# Patient Record
Sex: FEMALE | Race: WHITE | NOT HISPANIC OR LATINO | Employment: OTHER | URBAN - METROPOLITAN AREA
[De-identification: names, ages, dates, MRNs, and addresses within clinical notes are randomized per-mention and may not be internally consistent; named-entity substitution may affect disease eponyms.]

---

## 2018-01-19 ASSESSMENT — MIFFLIN-ST. JEOR
SCORE: 1399.47
SCORE: 1404

## 2018-01-20 ENCOUNTER — SURGERY - HEALTHEAST (OUTPATIENT)
Dept: GASTROENTEROLOGY | Facility: CLINIC | Age: 68
End: 2018-01-20

## 2018-02-14 ENCOUNTER — SURGERY - HEALTHEAST (OUTPATIENT)
Dept: CARDIOLOGY | Facility: CLINIC | Age: 68
End: 2018-02-14

## 2018-02-14 ASSESSMENT — MIFFLIN-ST. JEOR: SCORE: 1390.85

## 2018-12-10 ENCOUNTER — OFFICE VISIT - HEALTHEAST (OUTPATIENT)
Dept: GERIATRICS | Facility: CLINIC | Age: 68
End: 2018-12-10

## 2018-12-10 DIAGNOSIS — I10 ESSENTIAL HYPERTENSION: ICD-10-CM

## 2018-12-10 DIAGNOSIS — S52.501D CLOSED FRACTURE OF DISTAL END OF RIGHT RADIUS WITH ROUTINE HEALING, UNSPECIFIED FRACTURE MORPHOLOGY, SUBSEQUENT ENCOUNTER: ICD-10-CM

## 2018-12-10 DIAGNOSIS — E11.9 TYPE 2 DIABETES MELLITUS WITHOUT COMPLICATION, WITHOUT LONG-TERM CURRENT USE OF INSULIN (H): ICD-10-CM

## 2018-12-10 DIAGNOSIS — E03.9 ACQUIRED HYPOTHYROIDISM: ICD-10-CM

## 2018-12-11 ENCOUNTER — AMBULATORY - HEALTHEAST (OUTPATIENT)
Dept: ADMINISTRATIVE | Facility: CLINIC | Age: 68
End: 2018-12-11

## 2018-12-11 ENCOUNTER — OFFICE VISIT - HEALTHEAST (OUTPATIENT)
Dept: GERIATRICS | Facility: CLINIC | Age: 68
End: 2018-12-11

## 2018-12-11 DIAGNOSIS — S52.501D CLOSED FRACTURE OF DISTAL END OF RIGHT RADIUS WITH ROUTINE HEALING, UNSPECIFIED FRACTURE MORPHOLOGY, SUBSEQUENT ENCOUNTER: ICD-10-CM

## 2018-12-11 DIAGNOSIS — I10 ESSENTIAL HYPERTENSION: ICD-10-CM

## 2018-12-11 DIAGNOSIS — E11.9 TYPE 2 DIABETES MELLITUS WITHOUT COMPLICATION, WITHOUT LONG-TERM CURRENT USE OF INSULIN (H): ICD-10-CM

## 2018-12-11 RX ORDER — AMOXICILLIN 250 MG
2 CAPSULE ORAL 2 TIMES DAILY
Status: SHIPPED | COMMUNITY
Start: 2018-12-11

## 2018-12-11 RX ORDER — ALBUTEROL SULFATE 90 UG/1
2 AEROSOL, METERED RESPIRATORY (INHALATION) EVERY 4 HOURS PRN
Status: SHIPPED | COMMUNITY
Start: 2018-12-11

## 2018-12-11 RX ORDER — MULTIPLE VITAMINS W/ MINERALS TAB 9MG-400MCG
1 TAB ORAL DAILY
Status: SHIPPED | COMMUNITY
Start: 2018-12-11

## 2018-12-11 RX ORDER — FLUOXETINE 40 MG/1
80 CAPSULE ORAL DAILY
Status: SHIPPED | COMMUNITY
Start: 2018-12-11

## 2018-12-11 RX ORDER — BISACODYL 5 MG/1
5 TABLET, DELAYED RELEASE ORAL DAILY PRN
Status: SHIPPED | COMMUNITY
Start: 2018-12-11

## 2018-12-11 RX ORDER — FLUTICASONE PROPIONATE AND SALMETEROL 113; 14 UG/1; UG/1
1 POWDER, METERED RESPIRATORY (INHALATION) EVERY 12 HOURS PRN
Status: SHIPPED | COMMUNITY
Start: 2018-12-11

## 2018-12-11 RX ORDER — FERROUS SULFATE 325(65) MG
1 TABLET ORAL
Status: SHIPPED | COMMUNITY
Start: 2018-12-11

## 2018-12-11 RX ORDER — LOSARTAN POTASSIUM 100 MG/1
100 TABLET ORAL DAILY
Status: SHIPPED | COMMUNITY
Start: 2018-12-11

## 2018-12-11 RX ORDER — MIRTAZAPINE 15 MG/1
15 TABLET, FILM COATED ORAL AT BEDTIME
Status: SHIPPED | COMMUNITY
Start: 2018-12-11

## 2018-12-12 ENCOUNTER — OFFICE VISIT - HEALTHEAST (OUTPATIENT)
Dept: GERIATRICS | Facility: CLINIC | Age: 68
End: 2018-12-12

## 2018-12-12 DIAGNOSIS — E11.9 TYPE 2 DIABETES MELLITUS WITHOUT COMPLICATION, WITHOUT LONG-TERM CURRENT USE OF INSULIN (H): ICD-10-CM

## 2018-12-12 DIAGNOSIS — S52.501D CLOSED FRACTURE OF DISTAL END OF RIGHT RADIUS WITH ROUTINE HEALING, UNSPECIFIED FRACTURE MORPHOLOGY, SUBSEQUENT ENCOUNTER: ICD-10-CM

## 2018-12-12 DIAGNOSIS — E03.9 ACQUIRED HYPOTHYROIDISM: ICD-10-CM

## 2018-12-12 DIAGNOSIS — I10 ESSENTIAL HYPERTENSION: ICD-10-CM

## 2018-12-13 ENCOUNTER — OFFICE VISIT - HEALTHEAST (OUTPATIENT)
Dept: GERIATRICS | Facility: CLINIC | Age: 68
End: 2018-12-13

## 2018-12-13 DIAGNOSIS — S52.501D CLOSED FRACTURE OF DISTAL END OF RIGHT RADIUS WITH ROUTINE HEALING, UNSPECIFIED FRACTURE MORPHOLOGY, SUBSEQUENT ENCOUNTER: ICD-10-CM

## 2018-12-13 DIAGNOSIS — I10 ESSENTIAL HYPERTENSION: ICD-10-CM

## 2018-12-13 DIAGNOSIS — R42 DIZZINESS: ICD-10-CM

## 2018-12-14 ENCOUNTER — OFFICE VISIT - HEALTHEAST (OUTPATIENT)
Dept: GERIATRICS | Facility: CLINIC | Age: 68
End: 2018-12-14

## 2018-12-14 DIAGNOSIS — E03.9 ACQUIRED HYPOTHYROIDISM: ICD-10-CM

## 2018-12-14 DIAGNOSIS — S52.501D CLOSED FRACTURE OF DISTAL END OF RIGHT RADIUS WITH ROUTINE HEALING, UNSPECIFIED FRACTURE MORPHOLOGY, SUBSEQUENT ENCOUNTER: ICD-10-CM

## 2018-12-14 DIAGNOSIS — I10 ESSENTIAL HYPERTENSION: ICD-10-CM

## 2018-12-17 ENCOUNTER — AMBULATORY - HEALTHEAST (OUTPATIENT)
Dept: GERIATRICS | Facility: CLINIC | Age: 68
End: 2018-12-17

## 2018-12-17 ENCOUNTER — COMMUNICATION - HEALTHEAST (OUTPATIENT)
Dept: GERIATRICS | Facility: CLINIC | Age: 68
End: 2018-12-17

## 2021-05-31 VITALS — HEIGHT: 63 IN | BODY MASS INDEX: 36.32 KG/M2 | WEIGHT: 205 LBS

## 2021-06-01 VITALS — HEIGHT: 63 IN | BODY MASS INDEX: 35.81 KG/M2 | WEIGHT: 202.1 LBS

## 2021-06-02 VITALS — BODY MASS INDEX: 37.73 KG/M2 | WEIGHT: 213 LBS

## 2021-06-16 PROBLEM — I20.89 ANGINA AT REST (H): Status: ACTIVE | Noted: 2018-02-12

## 2021-06-16 PROBLEM — T39.395A NSAID INDUCED GASTRITIS: Status: ACTIVE | Noted: 2018-01-19

## 2021-06-16 PROBLEM — K29.60 NSAID INDUCED GASTRITIS: Status: ACTIVE | Noted: 2018-01-19

## 2021-06-16 PROBLEM — K92.1 BLACK STOOLS: Status: ACTIVE | Noted: 2018-01-19

## 2021-06-16 PROBLEM — S52.501D CLOSED FRACTURE OF DISTAL END OF RIGHT RADIUS WITH ROUTINE HEALING: Status: ACTIVE | Noted: 2018-12-12

## 2021-06-16 PROBLEM — R94.39 ABNORMAL STRESS TEST: Status: ACTIVE | Noted: 2018-02-13

## 2021-06-16 PROBLEM — E03.9 ACQUIRED HYPOTHYROIDISM: Status: ACTIVE | Noted: 2018-12-12

## 2021-06-22 NOTE — PROGRESS NOTES
[facility]    Facility:   [facility] [[id number]]   Code Status: POLST AVAILABLE    Admission evaluation to TCU of 68-year-old female. History is taken from accompanying hospital notes and from patient who gives a detailed history. Long-standing hypertension, hypothyroidism on replacement, chronic depression, asthma, chronic nonspecific dizziness, history of multiple falls, patient was decorating a [employer] tree, stepped backward striking a stool, braced her fall sustaining displaced distal right radius fracture, underwent closed reduction and splinting, stabilized and transferred to TCU for rehabilitation, pain management, management of medical problems. Patient resides with her  in home setting, he recently had a GEORGIE and is unable to care for patient.    Current medical problem list, past medical history, drug allergies, current medication list, social history, family history, full code status reviewed in epic. Family history positive extensively for autoimmune disease, scleroderma in siblings, brother with Crohn's, brother with coronary artery disease.    Review of systems: significant pain right wrist, not using splint, attempting to keep right arm elevated. Patient is left-handed. Describes dizziness as a lightheadedness, at times vertiginous symptoms, questions what could cause this chronic problem. Does not relate dizziness to timing of blood pressure medication or two specific activities. No hearing loss or tinnitus. Unaware of sinus congestion. Chronic sleep apnea with use of CPAP. Questionable sense of congestion ears. No double vision or blurring of vision. Denies chest pain or palpitations. No fever sweats or chills. No active G.I. or  symptoms. Generalized weakness present. No active depressive symptoms, has been caring for multiple family members of late. Remainder of 12 point review of systems obtained negative.    Exam: patient alert, oriented, sling not  present, highly communicative. Sitting in chair, using computer. Blood pressure 113/72, pulse 58, 02 97% on room air, temperature 90.4, respiratory 18. Crowded oral pharynx. No facial asymmetry. PERRLA, EOMI. No credit bruits or HJR. Thyroid finely granular without dominant nodules or tenderness. S1 and S2 regular. Pulmonary exam with shallow inspiratory effort, dry crackles left lung base, no rales or wheezes. Abdomen obese, no organomegaly masses or tenderness. Right arm casted, soft-tissue swelling of all digits, sensation intact, unable to clench right hand fully due to swelling. Left hand intact. Left hand dominant. Periphery without edema. Trace venous stasis changes. Pedal pulses symmetrical. No calf tenderness or swelling.    Impression and plan:   distal radius fracture status post ORIF, significant pain, oxycodone currently in use at 10 mg Q4 hours PRN, patient taking this on a routine basis, reviewed need to elevate arm, sling would be beneficial in this regard. Begin Tylenol 1000 mg TID routine, attempt to limit oxycodone use.   Soft-tissue swelling right hand, no neurologic impairment post surgical, encouraged elevation of hand.    Hypothyroidism on replacement, Krill oil relatively contraindicated with use of Synthroid, discontinue Krilloil.   Low-grade depression on fluoxetine 80 mg Q day and Remeron 15 mg Q day, mood stable   Hypertension on losartan 100 mg Q day hydralazine 10 mg TID times two days, monitor for hypo tension while on hydralazine.   Chronic intermittent dizziness suggestive of inner ear dysfunction, reviewed differential diagnosis of dizziness, potential contribution from medication, management.   Reactive airways/asthma, use of albuterol and airduo to be continued.   Vitamin supplementation reviewed with patient.   Diabetes mellitus 2 on oral agents, Accu checks satisfactory.   Obstructive sleep apnea, continue  CPAP.   Extensive positive family history of autoimmune disease  reviewed. Total time of admission evaluation greater than 45 minutes, greater than 50% of time spent in coordination of care and counseling.      Electronically signed by: Zenaida Mancera MD

## 2021-06-22 NOTE — PROGRESS NOTES
[facility]    Facility:   [facility] [[id number]]   Code Status: POLST AVAILABLE    Reassessment of 68-year-old female admitted to hospital following a fall with distal right radius fracture displaced, underwent ORIF, stabilized in hospital, accelerated hyper tension during hospitalization, hydralazine added to Cozaar on a temporary basis, stabilized in transferred to TCU for rehabilitation, management of multiple medical problems.    Review of systems: attempting to limit use of oxycodone. Continues with out use of sling. Continued swelling right hand. Denies paresthesias right hand, unable to fully grasp hand due two soft-tissue swelling. Chronic complaints of dizziness, continues to have intermittent sense of lightheadedness, no definitive vertiginous symptoms. Denies orthostatic changes. Symptoms present for a prolonged period of time. Denies focal neurologic deficits. No gait impairment. No chest pain or palpitations. Denies orthopnea or PND. Does not feel ready to return to home setting. Remainder of 12 point review of systems obtained negative.    Exam: patient alert and oriented, highly conversant, bends forward attempting to maneuver computer, stands upright without complaints of dizziness or physical signs of distress. Temperature 97.8, pulse 67, respiratory 16, 02 95%, blood pressure 174/85, 164/77. Left tympanic membrane with retraction and dullness, no erythema, loss of cone of light. Right tympanic membrane partially obscured with dry cerumen, loss of cone of light, dullness, no retraction or erythema. Nasal mucosa without bogginess, narrow nasal passages. Extraocular movements intact. Pharynx with crowded anatomy, no postnasal drip, no erythema. No credit bruits or HJR. Thyroid finely granular without dominant nodules. S1 and S2 regular. Pulmonary exam with shallow inspiratory effort, dry crackles left lung base, no wheezes or rales. Abdomen without masses  tenderness organomegaly. Periphery without edema. Pedal pulses symmetrical. No calf tenderness or swelling. Right forearm casted, soft-tissue swelling of all digital right hand, sensation intact.    Impression and plan:   distal right radius fracture, soft-tissue swelling continues postoperatively, patient declining use of sling, encouraged arm elevation at all times.   Hypothyroidism, again reviewed relative contraindication to use of Krill oil with Synthroid.   Chronic intermittent dizziness, retraction of left tympanic membrane, dullness bilateral tympanic membranes supportive of eustachian tube dysfunction dx, begin Flonase nasal spray two sprays Q nostril Q day.   Multiple supplemental vitamin use, reviewed use of vitamin AD and E, reviewed potential side effects of fat-soluble vitamin supplementation.   Hypertension, intermittent elevation of blood pressure, blood pressure trending downward, continues Cozaar, off hydralazine.   Deconditioning with need for rehabilitation. Patient will continue with rehabilitation measures, continues Metformin for diabetes mellitus, multiple concerns are reviewed with prolonged evaluation time.      Electronically signed by: Zenaida Mancera MD

## 2021-06-22 NOTE — PROGRESS NOTES
[facility]    Facility:   [facility] [[id number]]   Code Status: FULL CODE  PCP: Elizabeth Arrieta MD   Phone: [phone]   Fax: [phone]      CHIEF COMPLAINT/REASON FOR VISIT:  Chief Complaint   Patient presents with     Discharge Summary       HISTORY COURSE:  Will discharge on Monday.  Yvonne is a 68 y.o. female who has a primary history of hypertension on losartan and hypothyroidism on Synthroid, who sustained right distal radial fracture and subsequent ORIF on 12/8 after falling backwards and using her right arm to brace herself; she was helping her daughter set up her [employer] tree and tripped over a step stool.  She was transferred to TCU for rehab and pain control.  She is requesting 10 mg oxycodone every 4 hours reports significant pain.  She does have sensation and is able to move fingers slightly in cast.  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     TCU:   Today: Reportedly is removing her cast and Ace wrap at night, she thinks that this is okay because the doctor told her to do it for swelling however the Ace wrap is quite loose and I informed her that she should be having nursing do this so that it can be wrapped appropriately for stabilization.  Swelling appears improved from the other day and her pain is tolerable without oxycodone x 1 day and just acetaminophen 975 three times a day. Her constipation is improved.     R distal radial fx/ORIF: Remains casted and wearing sling, she has full sensation and can wiggle her fingers, nonweightbearing to right upper extremity. Will follow up with hand therapy via [facility].      Hypertension: Had some elevations while hospitalized possibly due to pain, she was discharged on losartan and has had variable blood pressure in TCU. Blood pressures remain elevated 170s/80-90s. D/c losartan and change to hyzaar 100mg/12.5. Follow up  with pcp next week to trend improvement.       Hypothyroid: Synthroid dose was upped in the hospital to 137 mcg.  Most recent TSH was 12 in December 2018. Recheck end Jan/Feb.      DMII: On metformin and diet controlled.       Review of Systems    Vitals:    12/17/18 0958   BP: 173/89   Pulse: 80   Temp: 97  F (36.1  C)   SpO2: 97%       Physical Exam    MEDICATION LIST:  Current Outpatient Medications   Medication Sig     albuterol (VENTOLIN HFA) 90 mcg/actuation inhaler Inhale 2 puffs every 4 (four) hours as needed for wheezing.     bisacodyl (DULCOLAX) 5 mg EC tablet Take 5 mg by mouth daily as needed for constipation.     calcium carbonate-mag hydroxid (ROLAIDS) 550-110 mg Chew Chew 2 tablets 2 (two) times a day as needed.     cholecalciferol, vitamin D3, 1,000 unit tablet Take 1,000 Units by mouth daily Give in morning.           cyanocobalamin 1000 MCG tablet Take 1,000 mcg by mouth daily.     ferrous sulfate 325 (65 FE) MG tablet Take 1 tablet by mouth daily with breakfast.     FLUoxetine (PROZAC) 40 MG capsule Take 80 mg by mouth daily.     fluticasone-salmeterol 113-14 mcg/actuation AePB Inhale 1 puff every 12 (twelve) hours as needed (1 puff).     KRILL OIL ORAL Take 1 capsule by mouth daily.     levothyroxine (SYNTHROID, LEVOTHROID) 137 MCG tablet Take 137 mcg by mouth Daily at 6:00 am.     losartan (COZAAR) 100 MG tablet Take 100 mg by mouth daily Give in morning .     metFORMIN (GLUCOPHAGE) 500 MG tablet Take 500 mg by mouth 2 (two) times a day with meals.     mirtazapine (REMERON) 15 MG tablet Take 15 mg by mouth at bedtime.     multivitamin with minerals (THERA-M) 9 mg iron-400 mcg Tab tablet Take 1 tablet by mouth daily Give in morning .     senna-docusate (SENNOSIDES-DOCUSATE SODIUM) 8.6-50 mg tablet Take 2 tablets by mouth 2 (two) times a day.       DISCHARGE DIAGNOSIS:    ICD-10-CM    1. Closed fracture of distal end of right radius with routine healing, unspecified fracture morphology, subsequent  encounter S52.501D    2. Essential hypertension I10    3. Acquired hypothyroidism E03.9        MEDICAL EQUIPMENT NEEDS:  Has sling; no other equipment needs.     DISCHARGE PLAN/FACE TO FACE:  Follow up with hand therapy via [facility] Monday.   Do not remove ace wrap unless assisted by RN.   Pain control: Acetaminophen 975 three times a day.   Recent switch from losartan to hyzaar on Friday. Please f/u closely with your pcp in 5-7 days.     The patient is, or has been, under my care and I have initiated the establishment of the plan of care. This patient will be followed by a physician who will periodically review the plan of care.    Schedule follow up visit with primary care provider within 7 days to reestablish care.    Electronically signed by: Brandyn Bryant CNP

## 2021-06-22 NOTE — PROGRESS NOTES
[facility]      Facility:    [facility] [[id number]]  Code Status: FULL CODE      Chief Complaint/Reason for Visit:  Chief Complaint   Patient presents with     Review Of Multiple Medical Conditions       HPI:   Yvonne is a 68 y.o. female who has a primary history of hypertension on losartan and hypothyroidism on Synthroid, who sustained right distal radial fracture and subsequent ORIF on 12/8 after falling backwards and using her right arm to brace herself; she was helping her daughter set up her [place] tree and tripped over a step stool.  She was transferred to TCU for rehab and pain control.  She is requesting 10 mg oxycodone every 4 hours reports significant pain.  She does have sensation and is able to move fingers slightly in cast.  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    TCU:   Today she is seen per nursing request pain control she is requesting more oxycodone as she is only sent to the facility with 15 tabs and she is using 2 tabs(10mg) every 4 hours and Tylenol 975 3 times a day.  We discussed long-term pain management and I will renew this dose for 1 week and then discussed titrating down.  She is otherwise relatively independent with ambulation and ADLs, her only other concern today is mild constipation.    R distal radial fx/ORIF: Remains casted and wearing sling, she has full sensation and can wiggle her fingers, nonweightbearing to right upper extremity.    Hypertension: Had some elevations while hospitalized possibly due to pain, she was discharged on losartan and has had variable blood pressure in TCU.    Hypothyroid: Synthroid dose was upped in the hospital to 137 mcg.  Most recent TSH was 12.    DMII: On metformin and diet controlled.      Past Medical History:  Past Medical History:   Diagnosis Date     Anemia      Asthma      Closed right fibular fracture 01/03/2017      Depression      Diabetes mellitus (H)     type 2     Diabetes mellitus, type II (H)      Endometrial polyp      Family history of myocardial infarction     Brother 59 MI      GERD (gastroesophageal reflux disease)      High cholesterol      Hyperlipidemia      Hypertension      Hypothyroidism      Lung nodule     stable-right middle lobe     Monoclonal paraproteinemia      Multiple falls      Nuclear cataract of right eye, nonsenile 2018     Obesity      Ocular hypertension 2016     DARRICK (obstructive sleep apnea)      Pseudophakia of left eye 2018     Shoulder pain 2014     Squamous cell carcinoma of skin            Surgical History:  Past Surgical History:   Procedure Laterality Date     BREAST BIOPSY      biopsy     CARDIAC CATHETERIZATION  2018     CATARACT EXTRACTION Left      CV CORONARY ANGIOGRAM N/A 2018    Procedure: Coronary Angiogram;  Surgeon: Tom Pickens MD;  Location: [facility];  Service:      DILATION AND CURETTAGE OF UTERUS      in her 20's     ESOPHAGOGASTRODUODENOSCOPY N/A 2018    Procedure: ESOPHAGOGASTRODUODENOSCOPY (EGD);  Surgeon: Dejah Paniagua MD;  Location: [facility] GI;  Service:      ORIF DISTAL RADIUS FRACTURE  18     CT LAP GASTRIC BYPASS/JULY-EN-Y N/A 10/1/2014    Procedure: LAPAROSCOPIC JULY-EN-Y GASTRIC BYPASS;  Surgeon: Peterson Turcios MD;  Location: [facility] OR;  Service: General       Family History:   Family History   Problem Relation Age of Onset     Heart attack Brother      Diabetes Brother      Heart disease Brother      Cataracts Mother      Breast cancer Mother      Heart disease Mother      Glaucoma Mother      Macular degeneration Mother      Cataracts Father      Diabetes Sister      Crohn's disease Sister      Thyroid disease Sister         graves       Social History:    Social History     Socioeconomic History     Marital status:      Spouse name: Not on file     Number of children:  Not on file     Years of education: Not on file     Highest education level: Not on file   Social Needs     Financial resource strain: Not on file     Food insecurity - worry: Not on file     Food insecurity - inability: Not on file     Transportation needs - medical: Not on file     Transportation needs - non-medical: Not on file   Occupational History     Not on file   Tobacco Use     Smoking status: Former Smoker     Years: 10.00     Last attempt to quit: 1987     Years since quittin.9     Smokeless tobacco: Never Used   Substance and Sexual Activity     Alcohol use: No     Drug use: No     Sexual activity: Not on file   Other Topics Concern     Not on file   Social History Narrative     Not on file          Review of Systems   Constitutional: Negative for activity change, appetite change, chills, diaphoresis, fatigue and fever.   Respiratory: Negative for cough and shortness of breath.    Cardiovascular: Negative for chest pain.   Gastrointestinal: Positive for constipation. Negative for diarrhea and nausea.   Genitourinary: Negative for dysuria.   Musculoskeletal:        RUE pain, swelling, s/p surgery   Neurological: Negative for numbness.   Psychiatric/Behavioral: Negative for behavioral problems and confusion.       Vitals:    18 0944   BP: 174/85   Pulse: (!) 58   Temp: 98.1  F (36.7  C)   SpO2: 96%   Weight: 213 lb (96.6 kg)       Physical Exam   Constitutional: She appears well-developed and well-nourished.   HENT:   Head: Atraumatic.   Eyes: Scleral icterus is present.   Neck: No thyromegaly present.   Cardiovascular: Normal rate and regular rhythm.   Pulmonary/Chest: No respiratory distress. She has no wheezes.   Abdominal: There is no tenderness. There is no guarding.   Musculoskeletal:   RUE: in sling, fingers swollen, sensation intact, wiggles fingers.    Lymphadenopathy:     She has no cervical adenopathy.   Neurological: No cranial nerve deficit.   Skin: Skin is warm and dry.    Psychiatric: She has a normal mood and affect.       Medication List:  Current Outpatient Medications   Medication Sig     acetaminophen (TYLENOL) 325 MG tablet Take 650 mg by mouth every 4 (four) hours as needed for pain or fever Give 2 tablets by mouth every 4 hours as needed for discomfort/fever for 3 days *Not to exceed 4 GM/24 hours; Contact MD/NP for fever* .     albuterol (VENTOLIN HFA) 90 mcg/actuation inhaler Inhale 2 puffs every 4 (four) hours as needed for wheezing.     bisacodyl (DULCOLAX) 5 mg EC tablet Take 5 mg by mouth daily as needed for constipation.     calcium carbonate-mag hydroxid (ROLAIDS) 550-110 mg Chew Chew 2 tablets 2 (two) times a day as needed.     cholecalciferol, vitamin D3, 1,000 unit tablet Take 1,000 Units by mouth daily Give in morning.           cyanocobalamin 1000 MCG tablet Take 1,000 mcg by mouth daily.     ferrous sulfate 325 (65 FE) MG tablet Take 1 tablet by mouth daily with breakfast.     FLUoxetine (PROZAC) 40 MG capsule Take 80 mg by mouth daily.     fluticasone-salmeterol 113-14 mcg/actuation AePB Inhale 1 puff every 12 (twelve) hours as needed (1 puff).     KRILL OIL ORAL Take 1 capsule by mouth daily.     levothyroxine (SYNTHROID, LEVOTHROID) 137 MCG tablet Take 137 mcg by mouth Daily at 6:00 am.     losartan (COZAAR) 100 MG tablet Take 100 mg by mouth daily Give in morning .     metFORMIN (GLUCOPHAGE) 500 MG tablet Take 500 mg by mouth 2 (two) times a day with meals.     mirtazapine (REMERON) 15 MG tablet Take 15 mg by mouth at bedtime.     multivitamin with minerals (THERA-M) 9 mg iron-400 mcg Tab tablet Take 1 tablet by mouth daily Give in morning .     oxyCODONE (ROXICODONE) 5 MG immediate release tablet Take 10 mg by mouth every 4 (four) hours as needed for pain Give 2 tablets by mouth every 4 hours as needed for pain order clarified by Dr. Garima Fournier, [facility] Physician .     senna-docusate (SENNOSIDES-DOCUSATE SODIUM) 8.6-50 mg tablet Take 2 tablets by mouth 2  (two) times a day.       Labs:  Reviewed; hgb 11.5, wbc 9. TSH 12    Assessment:    ICD-10-CM    1. Essential hypertension I10    2. Closed fracture of distal end of right radius with routine healing, unspecified fracture morphology, subsequent encounter S52.501D    3. Type 2 diabetes mellitus without complication, without long-term current use of insulin (H) E11.9    4. Acquired hypothyroidism E03.9        Plan:  Changes: Give 1 dose milk of mag today and suppository per rectum x1 daily until results, patient has not had BM times 7 days.  She is otherwise comfortable, using oxycodone every 4 hours around-the-clock and Tylenol.  Attending therapies.    Admission history and physical per MD in the next week. At this time continue current care plan for all chronic medical conditions, as they are stable. Encouraged patient to engage in PT/OT for strengthening and conditioning. Encouraged patient to work [facility] nursing staff to ensure any medical complaints are quickly addressed. Follow up this week or sooner if needed. Will continue to monitor patient and work with nursing staff collaboratively to work toward positive patient outcomes.    35 minutes spent with coordination of hospital course and discussion of plan for tcu stay with patient and nursing with 17 minutes spent face to face with patient and nursing.     Electronically signed by: Brandyn Bryant CNP

## 2021-06-22 NOTE — PROGRESS NOTES
[facility]      Facility:    [facility] [[id number]]  Code Status: FULL CODE      Chief Complaint/Reason for Visit:  Chief Complaint   Patient presents with     Review Of Multiple Medical Conditions       HPI:   Yvonne is a 68 y.o. female who has a primary history of hypertension on losartan and hypothyroidism on Synthroid, who sustained right distal radial fracture and subsequent ORIF on 12/8 after falling backwards and using her right arm to brace herself; she was helping her daughter set up her schuyler tree and tripped over a step stool.  She was transferred to TCU for rehab and pain control.  She is requesting 10 mg oxycodone every 4 hours reports significant pain.  She does have sensation and is able to move fingers slightly in cast.  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    TCU:   Today: Reportedly is removing her cast and Ace wrap at night, she thinks that this is okay because the doctor told her to do it for swelling however the Ace wrap is quite loose and I informed her that she should be having nursing do this so that it can be wrapped appropriately for stabilization.  Swelling appears improved from the other day, her pain is tolerable on current regimen.  And her constipation is improved.    R distal radial fx/ORIF: Remains casted and wearing sling, she has full sensation and can wiggle her fingers, nonweightbearing to right upper extremity.    Hypertension: Had some elevations while hospitalized possibly due to pain, she was discharged on losartan and has had variable blood pressure in TCU. Will monitor x 3 days and add additional antihypertensive if no improvement.     Hypothyroid: Synthroid dose was upped in the hospital to 137 mcg.  Most recent TSH was 12.    DMII: On metformin and diet controlled.      Past Medical History:  Past Medical History:   Diagnosis Date     Anemia       Asthma      Closed right fibular fracture 2017     Depression      Diabetes mellitus (H)     type 2     Diabetes mellitus, type II (H)      Endometrial polyp      Family history of myocardial infarction     Brother 59 MI      GERD (gastroesophageal reflux disease)      High cholesterol      Hyperlipidemia      Hypertension      Hypothyroidism      Lung nodule     stable-right middle lobe     Monoclonal paraproteinemia      Multiple falls      Nuclear cataract of right eye, nonsenile 2018     Obesity      Ocular hypertension 2016     DARRICK (obstructive sleep apnea)      Pseudophakia of left eye 2018     Shoulder pain 2014     Squamous cell carcinoma of skin            Surgical History:  Past Surgical History:   Procedure Laterality Date     BREAST BIOPSY      biopsy     CARDIAC CATHETERIZATION  2018     CATARACT EXTRACTION Left      CV CORONARY ANGIOGRAM N/A 2018    Procedure: Coronary Angiogram;  Surgeon: Tom Pickens MD;  Location: [facility];  Service:      DILATION AND CURETTAGE OF UTERUS      in her 20's     ESOPHAGOGASTRODUODENOSCOPY N/A 2018    Procedure: ESOPHAGOGASTRODUODENOSCOPY (EGD);  Surgeon: Dejah Paniagua MD;  Location: [facility] GI;  Service:      ORIF DISTAL RADIUS FRACTURE  18     HI LAP GASTRIC BYPASS/JULY-EN-Y N/A 10/1/2014    Procedure: LAPAROSCOPIC JULY-EN-Y GASTRIC BYPASS;  Surgeon: Peterson Turcios MD;  Location: [facility];  Service: General       Family History:   Family History   Problem Relation Age of Onset     Heart attack Brother      Diabetes Brother      Heart disease Brother      Cataracts Mother      Breast cancer Mother      Heart disease Mother      Glaucoma Mother      Macular degeneration Mother      Cataracts Father      Diabetes Sister      Crohn's disease Sister      Thyroid disease Sister         graves       Social History:    Social History     Socioeconomic History     Marital status:       Spouse name: Not on file     Number of children: Not on file     Years of education: Not on file     Highest education level: Not on file   Social Needs     Financial resource strain: Not on file     Food insecurity - worry: Not on file     Food insecurity - inability: Not on file     Transportation needs - medical: Not on file     Transportation needs - non-medical: Not on file   Occupational History     Not on file   Tobacco Use     Smoking status: Former Smoker     Years: 10.00     Last attempt to quit: 1987     Years since quittin.9     Smokeless tobacco: Never Used   Substance and Sexual Activity     Alcohol use: No     Drug use: No     Sexual activity: Not on file   Other Topics Concern     Not on file   Social History Narrative     Not on file          Review of Systems   Constitutional: Negative for activity change, appetite change, chills, diaphoresis, fatigue and fever.   Respiratory: Negative for cough and shortness of breath.    Cardiovascular: Negative for chest pain.   Gastrointestinal: Positive for constipation. Negative for diarrhea and nausea.   Genitourinary: Negative for dysuria.   Musculoskeletal:        RUE pain, swelling, s/p surgery   Neurological: Negative for numbness.   Psychiatric/Behavioral: Negative for behavioral problems and confusion.       Vitals:    18 1453   BP: 169/80   Pulse: 63   SpO2: 94%       Physical Exam   Constitutional: She appears well-developed and well-nourished.   HENT:   Head: Atraumatic.   Eyes: No scleral icterus.   Neck: No thyromegaly present.   Cardiovascular: Normal rate and regular rhythm.   Pulmonary/Chest: No respiratory distress. She has no wheezes.   Abdominal: There is no tenderness. There is no guarding.   Musculoskeletal:   RUE: in sling, fingers swollen, sensation intact, wiggles fingers.    Lymphadenopathy:     She has no cervical adenopathy.   Neurological: No cranial nerve deficit.   Skin: Skin is warm and dry.    Psychiatric: She has a normal mood and affect.       Medication List:  Current Outpatient Medications   Medication Sig     acetaminophen (TYLENOL) 325 MG tablet Take 650 mg by mouth every 4 (four) hours as needed for pain or fever Give 2 tablets by mouth every 4 hours as needed for discomfort/fever for 3 days *Not to exceed 4 GM/24 hours; Contact MD/NP for fever* .     albuterol (VENTOLIN HFA) 90 mcg/actuation inhaler Inhale 2 puffs every 4 (four) hours as needed for wheezing.     bisacodyl (DULCOLAX) 5 mg EC tablet Take 5 mg by mouth daily as needed for constipation.     calcium carbonate-mag hydroxid (ROLAIDS) 550-110 mg Chew Chew 2 tablets 2 (two) times a day as needed.     cholecalciferol, vitamin D3, 1,000 unit tablet Take 1,000 Units by mouth daily Give in morning.           cyanocobalamin 1000 MCG tablet Take 1,000 mcg by mouth daily.     ferrous sulfate 325 (65 FE) MG tablet Take 1 tablet by mouth daily with breakfast.     FLUoxetine (PROZAC) 40 MG capsule Take 80 mg by mouth daily.     fluticasone-salmeterol 113-14 mcg/actuation AePB Inhale 1 puff every 12 (twelve) hours as needed (1 puff).     KRILL OIL ORAL Take 1 capsule by mouth daily.     levothyroxine (SYNTHROID, LEVOTHROID) 137 MCG tablet Take 137 mcg by mouth Daily at 6:00 am.     losartan (COZAAR) 100 MG tablet Take 100 mg by mouth daily Give in morning .     metFORMIN (GLUCOPHAGE) 500 MG tablet Take 500 mg by mouth 2 (two) times a day with meals.     mirtazapine (REMERON) 15 MG tablet Take 15 mg by mouth at bedtime.     multivitamin with minerals (THERA-M) 9 mg iron-400 mcg Tab tablet Take 1 tablet by mouth daily Give in morning .     oxyCODONE (ROXICODONE) 5 MG immediate release tablet Take 10 mg by mouth every 4 (four) hours as needed for pain Give 2 tablets by mouth every 4 hours as needed for pain order clarified by Dr. Garima Fournier, [facility] Physician .     senna-docusate (SENNOSIDES-DOCUSATE SODIUM) 8.6-50 mg tablet Take 2 tablets by mouth 2  (two) times a day.       Labs:  Reviewed; hgb 11.5, wbc 9. TSH 12    Assessment:    ICD-10-CM    1. Closed fracture of distal end of right radius with routine healing, unspecified fracture morphology, subsequent encounter S52.501D    2. Essential hypertension I10    3. Acquired hypothyroidism E03.9    4. Type 2 diabetes mellitus without complication, without long-term current use of insulin (H) E11.9        Plan:  Changes: Reports she had a BM.  She is otherwise comfortable, using oxycodone every 4 hours around-the-clock and Tylenol.  Attending therapies.  HTN: We will continue to monitor blood pressures if they remain elevated in the 160s where they have been will consider adding additional antihypertensive medication as her pain is well controlled and is likely noncontributory.    25 minutes spent with coordination of hospital course and discussion of plan for tcu stay with patient and nursing with 17 minutes spent face to face with patient and nursing.     Electronically signed by: Brandyn Bryant CNP

## 2022-09-26 ENCOUNTER — APPOINTMENT (OUTPATIENT)
Dept: MRI IMAGING | Facility: CLINIC | Age: 72
End: 2022-09-26
Attending: EMERGENCY MEDICINE
Payer: MEDICARE

## 2022-09-26 ENCOUNTER — HOSPITAL ENCOUNTER (EMERGENCY)
Facility: CLINIC | Age: 72
Discharge: HOME | End: 2022-09-26
Attending: STUDENT IN AN ORGANIZED HEALTH CARE EDUCATION/TRAINING PROGRAM | Admitting: STUDENT IN AN ORGANIZED HEALTH CARE EDUCATION/TRAINING PROGRAM
Payer: MEDICARE

## 2022-09-26 ENCOUNTER — TELEPHONE (OUTPATIENT)
Dept: NEUROLOGY | Facility: CLINIC | Age: 72
End: 2022-09-26

## 2022-09-26 VITALS
OXYGEN SATURATION: 98 % | DIASTOLIC BLOOD PRESSURE: 80 MMHG | RESPIRATION RATE: 16 BRPM | BODY MASS INDEX: 39.69 KG/M2 | HEART RATE: 66 BPM | TEMPERATURE: 97.7 F | WEIGHT: 224 LBS | SYSTOLIC BLOOD PRESSURE: 167 MMHG | HEIGHT: 63 IN

## 2022-09-26 DIAGNOSIS — N39.0 URINARY TRACT INFECTION WITHOUT HEMATURIA, SITE UNSPECIFIED: ICD-10-CM

## 2022-09-26 DIAGNOSIS — R41.0 CONFUSION: ICD-10-CM

## 2022-09-26 DIAGNOSIS — R41.3 MEMORY IMPAIRMENT: ICD-10-CM

## 2022-09-26 LAB
ALBUMIN SERPL-MCNC: 3.3 G/DL (ref 3.5–5)
ALBUMIN UR-MCNC: 50 MG/DL
ALP SERPL-CCNC: 139 U/L (ref 45–120)
ALT SERPL W P-5'-P-CCNC: 21 U/L (ref 0–45)
ANION GAP SERPL CALCULATED.3IONS-SCNC: 9 MMOL/L (ref 5–18)
APPEARANCE UR: ABNORMAL
AST SERPL W P-5'-P-CCNC: 19 U/L (ref 0–40)
ATRIAL RATE - MUSE: 66 BPM
BACTERIA #/AREA URNS HPF: ABNORMAL /HPF
BASOPHILS # BLD AUTO: 0.1 10E3/UL (ref 0–0.2)
BASOPHILS NFR BLD AUTO: 1 %
BILIRUB SERPL-MCNC: 0.3 MG/DL (ref 0–1)
BILIRUB UR QL STRIP: NEGATIVE
BUN SERPL-MCNC: 16 MG/DL (ref 8–28)
CALCIUM SERPL-MCNC: 9.2 MG/DL (ref 8.5–10.5)
CHLORIDE BLD-SCNC: 101 MMOL/L (ref 98–107)
CO2 SERPL-SCNC: 27 MMOL/L (ref 22–31)
COLOR UR AUTO: YELLOW
CREAT SERPL-MCNC: 1.1 MG/DL (ref 0.6–1.1)
DIASTOLIC BLOOD PRESSURE - MUSE: NORMAL MMHG
EOSINOPHIL # BLD AUTO: 0.4 10E3/UL (ref 0–0.7)
EOSINOPHIL NFR BLD AUTO: 4 %
ERYTHROCYTE [DISTWIDTH] IN BLOOD BY AUTOMATED COUNT: 15.1 % (ref 10–15)
GFR SERPL CREATININE-BSD FRML MDRD: 52 ML/MIN/1.73M2
GLUCOSE BLD-MCNC: 176 MG/DL (ref 70–125)
GLUCOSE UR STRIP-MCNC: NEGATIVE MG/DL
HCT VFR BLD AUTO: 44 % (ref 35–47)
HGB BLD-MCNC: 13.8 G/DL (ref 11.7–15.7)
HGB UR QL STRIP: ABNORMAL
HYALINE CASTS: 5 /LPF
IMM GRANULOCYTES # BLD: 0.1 10E3/UL
IMM GRANULOCYTES NFR BLD: 1 %
INR PPP: 0.9 (ref 0.85–1.15)
INTERPRETATION ECG - MUSE: NORMAL
KETONES UR STRIP-MCNC: NEGATIVE MG/DL
LEUKOCYTE ESTERASE UR QL STRIP: ABNORMAL
LYMPHOCYTES # BLD AUTO: 2.8 10E3/UL (ref 0.8–5.3)
LYMPHOCYTES NFR BLD AUTO: 27 %
MAGNESIUM SERPL-MCNC: 2.2 MG/DL (ref 1.8–2.6)
MCH RBC QN AUTO: 28 PG (ref 26.5–33)
MCHC RBC AUTO-ENTMCNC: 31.4 G/DL (ref 31.5–36.5)
MCV RBC AUTO: 89 FL (ref 78–100)
MONOCYTES # BLD AUTO: 0.7 10E3/UL (ref 0–1.3)
MONOCYTES NFR BLD AUTO: 7 %
NEUTROPHILS # BLD AUTO: 6.4 10E3/UL (ref 1.6–8.3)
NEUTROPHILS NFR BLD AUTO: 60 %
NITRATE UR QL: NEGATIVE
NRBC # BLD AUTO: 0 10E3/UL
NRBC BLD AUTO-RTO: 0 /100
P AXIS - MUSE: -27 DEGREES
PH UR STRIP: 6 [PH] (ref 5–7)
PLATELET # BLD AUTO: 450 10E3/UL (ref 150–450)
POTASSIUM BLD-SCNC: 3.6 MMOL/L (ref 3.5–5)
PR INTERVAL - MUSE: 132 MS
PROT SERPL-MCNC: 7.7 G/DL (ref 6–8)
QRS DURATION - MUSE: 88 MS
QT - MUSE: 456 MS
QTC - MUSE: 478 MS
R AXIS - MUSE: -2 DEGREES
RBC # BLD AUTO: 4.93 10E6/UL (ref 3.8–5.2)
RBC URINE: 12 /HPF
SODIUM SERPL-SCNC: 137 MMOL/L (ref 136–145)
SP GR UR STRIP: 1.03 (ref 1–1.03)
SQUAMOUS EPITHELIAL: 1 /HPF
SYSTOLIC BLOOD PRESSURE - MUSE: NORMAL MMHG
T AXIS - MUSE: 79 DEGREES
UROBILINOGEN UR STRIP-MCNC: <2 MG/DL
VENTRICULAR RATE- MUSE: 66 BPM
WBC # BLD AUTO: 10.3 10E3/UL (ref 4–11)
WBC URINE: >182 /HPF

## 2022-09-26 PROCEDURE — 36415 COLL VENOUS BLD VENIPUNCTURE: CPT | Performed by: EMERGENCY MEDICINE

## 2022-09-26 PROCEDURE — 99285 EMERGENCY DEPT VISIT HI MDM: CPT | Mod: 25

## 2022-09-26 PROCEDURE — 83735 ASSAY OF MAGNESIUM: CPT | Performed by: EMERGENCY MEDICINE

## 2022-09-26 PROCEDURE — 81001 URINALYSIS AUTO W/SCOPE: CPT | Performed by: STUDENT IN AN ORGANIZED HEALTH CARE EDUCATION/TRAINING PROGRAM

## 2022-09-26 PROCEDURE — 85610 PROTHROMBIN TIME: CPT | Performed by: EMERGENCY MEDICINE

## 2022-09-26 PROCEDURE — 70544 MR ANGIOGRAPHY HEAD W/O DYE: CPT

## 2022-09-26 PROCEDURE — 250N000013 HC RX MED GY IP 250 OP 250 PS 637: Performed by: STUDENT IN AN ORGANIZED HEALTH CARE EDUCATION/TRAINING PROGRAM

## 2022-09-26 PROCEDURE — 80053 COMPREHEN METABOLIC PANEL: CPT | Performed by: EMERGENCY MEDICINE

## 2022-09-26 PROCEDURE — A9585 GADOBUTROL INJECTION: HCPCS | Performed by: STUDENT IN AN ORGANIZED HEALTH CARE EDUCATION/TRAINING PROGRAM

## 2022-09-26 PROCEDURE — 85025 COMPLETE CBC W/AUTO DIFF WBC: CPT | Performed by: EMERGENCY MEDICINE

## 2022-09-26 PROCEDURE — 70549 MR ANGIOGRAPH NECK W/O&W/DYE: CPT

## 2022-09-26 PROCEDURE — 87086 URINE CULTURE/COLONY COUNT: CPT | Performed by: STUDENT IN AN ORGANIZED HEALTH CARE EDUCATION/TRAINING PROGRAM

## 2022-09-26 PROCEDURE — 255N000002 HC RX 255 OP 636: Performed by: STUDENT IN AN ORGANIZED HEALTH CARE EDUCATION/TRAINING PROGRAM

## 2022-09-26 PROCEDURE — 93005 ELECTROCARDIOGRAM TRACING: CPT | Performed by: EMERGENCY MEDICINE

## 2022-09-26 PROCEDURE — 70553 MRI BRAIN STEM W/O & W/DYE: CPT

## 2022-09-26 RX ORDER — CEPHALEXIN 500 MG/1
500 CAPSULE ORAL ONCE
Status: DISCONTINUED | OUTPATIENT
Start: 2022-09-26 | End: 2022-09-26

## 2022-09-26 RX ORDER — CEFDINIR 300 MG/1
300 CAPSULE ORAL ONCE
Status: COMPLETED | OUTPATIENT
Start: 2022-09-26 | End: 2022-09-26

## 2022-09-26 RX ORDER — GADOBUTROL 604.72 MG/ML
10 INJECTION INTRAVENOUS ONCE
Status: COMPLETED | OUTPATIENT
Start: 2022-09-26 | End: 2022-09-26

## 2022-09-26 RX ORDER — CEFDINIR 300 MG/1
300 CAPSULE ORAL 2 TIMES DAILY
Qty: 14 CAPSULE | Refills: 0 | Status: SHIPPED | OUTPATIENT
Start: 2022-09-26 | End: 2022-10-03

## 2022-09-26 RX ORDER — CLONIDINE HYDROCHLORIDE 0.1 MG/1
0.1 TABLET ORAL ONCE
Status: COMPLETED | OUTPATIENT
Start: 2022-09-26 | End: 2022-09-26

## 2022-09-26 RX ORDER — LORAZEPAM 2 MG/ML
1 INJECTION INTRAMUSCULAR ONCE
Status: DISCONTINUED | OUTPATIENT
Start: 2022-09-26 | End: 2022-09-26 | Stop reason: HOSPADM

## 2022-09-26 RX ADMIN — CLONIDINE HYDROCHLORIDE 0.1 MG: 0.1 TABLET ORAL at 19:31

## 2022-09-26 RX ADMIN — GADOBUTROL 10 ML: 604.72 INJECTION INTRAVENOUS at 17:59

## 2022-09-26 RX ADMIN — CEFDINIR 300 MG: 300 CAPSULE ORAL at 20:51

## 2022-09-26 ASSESSMENT — ACTIVITIES OF DAILY LIVING (ADL)
ADLS_ACUITY_SCORE: 35
ADLS_ACUITY_SCORE: 35

## 2022-09-26 NOTE — ED NOTES
Called MRI, they stated patient is currently calm and does not need the medication. Will call back if symptoms change

## 2022-09-26 NOTE — ED NOTES
Let that assessed the patient for possible stroke code activation given presenting symptoms.  Patient reporting onset of confusion and memory issues since Thursday with escalation to those symptoms today.  I did a limited history and neurological examination out in triage could not appreciate focal neurological deficits.  I did not feel the patient was a stroke code activation based on current examination and 5 days of symptoms.  We are initiating work-up with imaging and laboratory testing.  Full evaluation to follow by ED clinician.     Adithya Mitchell MD  09/26/22 [id number]

## 2022-09-26 NOTE — ED PROVIDER NOTES
EMERGENCY DEPARTMENT ENCOUNTER       ED Course & Medical Decision Making     7:01 PM Spoke with Dr Cain (Neurology)    Final Impression  72 year old female presents for evaluation of confusion that has been ongoing since Thursday (9/22).  Patient reports she was playing cards with her  and all of a sudden could not recall how to play the card game despite having played it for 23 years.  Has had ongoing issues like this that mostly relate to memory including long-term recall.  No other focal neurologic deficits like slurring of speech, focal motor weakness, or facial droop.  Symptoms ongoing for several days at time of arrival thus was not made a stroke code at triage.  MRI/MRA performed, shows some small vessel ischemic disease that is likely age-related, though is otherwise fairly unremarkable.  CBC and BMP also fairly unremarkable.  No recent medication changes, falls, trauma, or other injuries.  Patient was fairly hypertensive at triage with BP of 208/103.  Has a history of hypertension, typically runs in the 150s-160s, though has been seen in the clinic in the 180s in the last few months it looks like.  Discussed with neurology, they recommended attempting to decrease patient's blood pressure about 20% or so, though also recommended urine testing.  If work-up unrevealing and home situation unsafe, could potentially be admitted for EEG monitoring and neurology evaluation.  However, urinalysis returned fairly convincing for urinary tract infection which could potentially be related to her new onset confusion and memory issues thus will attempt trial of treatment.  Patient declining IV antibiotics, though was agreeable to dose of oral antibiotics.  Given dose of Omnicef in the ED and will discharge home with a weeklong course of Omnicef.  Does have primary care follow-up on Friday 9/30 which does seem like a reasonable follow-up..  Did discuss openly with patient, as well as her sister at bedside, that  "the of confusion symptoms may or may not be related to the presumed UTI and symptoms may or may not improve.  If symptoms improving the next few days, was likely related to the UTI and may not need further follow-up.  However, if symptoms fail to improve at all in the next 3 to 5 days will likely need follow-up for further evaluation, possibly with neurology.  Patient given dose of 0.1 mg oral clonidine and pressure improved to the 160s, though did not appear to have much improvement in her memory.  Did discuss home safety with patient as well as her sister that patient could tentatively be discharged home a week trial of this antibiotic course to see if it improves her memory issues, though she would need to refrain from driving and doing other things it would be unsafe to do alone if she were to have a memory lapse.  Patient and sister agreeable with this plan, will discharge home.  Patient lives with her .    Prior to making a final disposition on this patient the results of patient's tests and other diagnostic studies were discussed with the patient. All questions were answered. Patient expressed understanding of the plan and was amenable to it.    Medications   LORazepam (ATIVAN) injection 1 mg (1 mg Intravenous Not Given 9/26/22 1832)   gadobutrol (GADAVIST) injection 10 mL (10 mLs Intravenous Given 9/26/22 1759)   cloNIDine (CATAPRES) tablet 0.1 mg (0.1 mg Oral Given 9/26/22 1931)   cefdinir (OMNICEF) capsule 300 mg (300 mg Oral Given 9/26/22 2051)       Final Impression     1. Urinary tract infection without hematuria, site unspecified    2. Confusion    3. Memory impairment        Chief Complaint     Chief Complaint   Patient presents with     Altered Mental Status     Arrives to ED with c/o AMS that began on Thursday.  reports pt unable to remember how to play card game that pt has played for 23 years. Today pt went to [employer] \"and I knew I had a purpose, but I couldn't remember what it was\". " Denies vision changes. Strength equal to BUE/BLE. No facial droop noted. Speech clear. Provider called to triage for stroke eval, not called.     NOE Cam is a 72 year old female who presents for evaluation of confusion that has been ongoing since Thursday (9/22).     Past Medical History     Past Medical History:   Diagnosis Date     Anemia      Asthma      Closed right fibular fracture 01/03/2017     Depression      Diabetes mellitus (H)      Diabetes mellitus, type II (H)      Endometrial polyp      Family history of myocardial infarction      GERD (gastroesophageal reflux disease)      High cholesterol      Hyperlipidemia      Hypertension      Hypothyroidism      Lung nodule      Monoclonal paraproteinemia      Multiple falls      Nuclear cataract of right eye, nonsenile 04/04/2018     Obesity      Ocular hypertension 02/04/2016     DARRICK (obstructive sleep apnea)      Pseudophakia of left eye 06/29/2018     Shoulder pain 12/22/2014     Squamous cell carcinoma of skin      Past Surgical History:   Procedure Laterality Date     BIOPSY BREAST      biopsy     CARDIAC CATHETERIZATION  02/14/2018     CATARACT EXTRACTION Left      CV CORONARY ANGIOGRAM N/A 2/14/2018    Procedure: Coronary Angiogram;  Surgeon: Tom Pickens MD;  Location: [facility];  Service:      DILATION AND CURETTAGE      in her 20's     ESOPHAGOSCOPY, GASTROSCOPY, DUODENOSCOPY (EGD), COMBINED N/A 1/20/2018    Procedure: ESOPHAGOGASTRODUODENOSCOPY (EGD);  Surgeon: Dejah Painagua MD;  Location: [facility];  Service:      ORIF DISTAL RADIUS FRACTURE  12/18/18     CT LAP GASTRIC BYPASS/JULY-EN-Y N/A 10/1/2014    Procedure: LAPAROSCOPIC JULY-EN-Y GASTRIC BYPASS;  Surgeon: Peterson Turcios MD;  Location: [facility] OR;  Service: General     Family History   Problem Relation Age of Onset     Coronary Artery Disease Brother      Diabetes Brother      Heart Disease Brother      Cataracts Mother      Breast Cancer  Mother      Heart Disease Mother      Glaucoma Mother      Macular Degeneration Mother      Cataracts Father      Diabetes Sister      Crohn's Disease Sister      Thyroid Disease Sister         graves      Social History     Tobacco Use     Smoking status: Former Smoker     Years: 10.00     Quit date: 1987     Years since quittin.7     Smokeless tobacco: Never Used   Substance Use Topics     Alcohol use: No     Drug use: No     Allergies   Allergen Reactions     Amoxicillin Diarrhea     Atorvastatin Muscle Pain (Myalgia)     Gemfibrozil Muscle Pain (Myalgia)     Lisinopril Cough     Simvastatin Muscle Pain (Myalgia)     Sudafed [Pseudoephedrine] Other (See Comments)     Increased blood pressure     Morphine (Pf) [Morphine] Itching and Rash       Relevant past medical, surgical, family and social history as documented above, has been reviewed and discussed with patient. No changes or additions, unless otherwise noted in the HPI.    Current Medications     cefdinir (OMNICEF) 300 MG capsule  albuterol (VENTOLIN HFA) 90 mcg/actuation inhaler  bisacodyl (DULCOLAX) 5 mg EC tablet  calcium carbonate-mag hydroxid (ROLAIDS) 550-110 mg Chew  cholecalciferol, vitamin D3, 1,000 unit tablet  cyanocobalamin 1000 MCG tablet  ferrous sulfate 325 (65 FE) MG tablet  FLUoxetine (PROZAC) 40 MG capsule  fluticasone-salmeterol 113-14 mcg/actuation AePB  KRILL OIL ORAL  levothyroxine (SYNTHROID, LEVOTHROID) 137 MCG tablet  losartan (COZAAR) 100 MG tablet  metFORMIN (GLUCOPHAGE) 500 MG tablet  mirtazapine (REMERON) 15 MG tablet  multivitamin with minerals (THERA-M) 9 mg iron-400 mcg Tab tablet  senna-docusate (SENNOSIDES-DOCUSATE SODIUM) 8.6-50 mg tablet        Review of Systems     Constitutional: Denies fever, chills  Eyes: Denies visual changes  HENT: Denies sore throat, ear pain or neck pain.      Respiratory: Denies cough or shortness of breath.      Cardiovascular: Denies chest pain, palpitations  GI: Denies abdominal pain,  "nausea, vomiting  : Denies hematuria, dysuria, or flank pain.      Musculoskeletal: Denies any new back  Skin: Denies rashes or wound.      Neurologic: Denies current headache, new weakness, focal weakness, or sensory changes.  Positive for confusion, poor memory.        Remainder of systems reviewed, unless noted in HPI all others negative.    Physical Exam     BP (!) 167/80   Pulse 66   Temp 97.7  F (36.5  C) (Temporal)   Resp 16   Ht 1.6 m (5' 3\")   Wt 101.6 kg (224 lb)   SpO2 98%   BMI 39.68 kg/m    Constitutional: Awake, alert, in no acute distress.      Head: Normocephalic, atraumatic.      ENT: Mucous membranes moist.      Eyes: PERRL, EOMI, Conjunctiva normal.      Respiratory: Respirations even, unlabored. Lungs clear to ascultation bilaterally, in no acute respiratory distress.      Cardiovascular: Regular rate and rhythm. +2 radial pulses, equal bilaterally.  GI: Abdomen soft, non-tender. No guarding or rebound.   : No CVA tenderness.      Musculoskeletal: Moves all 4 extremities equally, strength symmetrical on bilateral uppers and lowers.      Integument: Warm, dry.   Neurologic: Alert, oriented to situation, though not time.  Can name president, though very little else.  Does appear confused about relatively simple things like the year of her birth.  Normal speech, no slurring or dysarthria. Grossly normal motor and sensory function. No focal deficits noted.  Patient ambulatory without deficit     Psychiatric: Normal mood and affect.     Labs & Imaging     Results for orders placed or performed during the hospital encounter of 09/26/22   MR Brain w/o & w Contrast    Impression    CONCLUSION:  HEAD MRI:   1.  No evidence of acute infarction or other acute intracranial abnormality.  2.  Mild presumed chronic small vessel ischemic change.    HEAD MRA:   1.  No evidence of proximal large vessel occlusion or flow-limiting stenosis.    NECK MRA:  1.  No evidence of hemodynamically significant " stenosis based on NASCET criteria.  2.  No evidence for dissection or pseudoaneurysm.   MRA Neck (Carotids) wo & w Contrast    Impression    CONCLUSION:  HEAD MRI:   1.  No evidence of acute infarction or other acute intracranial abnormality.  2.  Mild presumed chronic small vessel ischemic change.    HEAD MRA:   1.  No evidence of proximal large vessel occlusion or flow-limiting stenosis.    NECK MRA:  1.  No evidence of hemodynamically significant stenosis based on NASCET criteria.  2.  No evidence for dissection or pseudoaneurysm.   MRA Brain ([place]) wo Contrast    Impression    CONCLUSION:  HEAD MRI:   1.  No evidence of acute infarction or other acute intracranial abnormality.  2.  Mild presumed chronic small vessel ischemic change.    HEAD MRA:   1.  No evidence of proximal large vessel occlusion or flow-limiting stenosis.    NECK MRA:  1.  No evidence of hemodynamically significant stenosis based on NASCET criteria.  2.  No evidence for dissection or pseudoaneurysm.   Comprehensive metabolic panel   Result Value Ref Range    Sodium 137 136 - 145 mmol/L    Potassium 3.6 3.5 - 5.0 mmol/L    Chloride 101 98 - 107 mmol/L    Carbon Dioxide (CO2) 27 22 - 31 mmol/L    Anion Gap 9 5 - 18 mmol/L    Urea Nitrogen 16 8 - 28 mg/dL    Creatinine 1.10 0.60 - 1.10 mg/dL    Calcium 9.2 8.5 - 10.5 mg/dL    Glucose 176 (H) 70 - 125 mg/dL    Alkaline Phosphatase 139 (H) 45 - 120 U/L    AST 19 0 - 40 U/L    ALT 21 0 - 45 U/L    Protein Total 7.7 6.0 - 8.0 g/dL    Albumin 3.3 (L) 3.5 - 5.0 g/dL    Bilirubin Total 0.3 0.0 - 1.0 mg/dL    GFR Estimate 52 (L) >60 mL/min/1.73m2   Result Value Ref Range    INR 0.90 0.85 - 1.15   Result Value Ref Range    Magnesium 2.2 1.8 - 2.6 mg/dL   CBC with platelets and differential   Result Value Ref Range    WBC Count 10.3 4.0 - 11.0 10e3/uL    RBC Count 4.93 3.80 - 5.20 10e6/uL    Hemoglobin 13.8 11.7 - 15.7 g/dL    Hematocrit 44.0 35.0 - 47.0 %    MCV 89 78 - 100 fL    MCH 28.0 26.5  - 33.0 pg    MCHC 31.4 (L) 31.5 - 36.5 g/dL    RDW 15.1 (H) 10.0 - 15.0 %    Platelet Count 450 150 - 450 10e3/uL    % Neutrophils 60 %    % Lymphocytes 27 %    % Monocytes 7 %    % Eosinophils 4 %    % Basophils 1 %    % Immature Granulocytes 1 %    NRBCs per 100 WBC 0 <1 /100    Absolute Neutrophils 6.4 1.6 - 8.3 10e3/uL    Absolute Lymphocytes 2.8 0.8 - 5.3 10e3/uL    Absolute Monocytes 0.7 0.0 - 1.3 10e3/uL    Absolute Eosinophils 0.4 0.0 - 0.7 10e3/uL    Absolute Basophils 0.1 0.0 - 0.2 10e3/uL    Absolute Immature Granulocytes 0.1 <=0.4 10e3/uL    Absolute NRBCs 0.0 10e3/uL   UA with Microscopic reflex to Culture    Specimen: Urine, Midstream   Result Value Ref Range    Color Urine Yellow Colorless, Straw, Light Yellow, Yellow    Appearance Urine Turbid (A) Clear    Glucose Urine Negative Negative mg/dL    Bilirubin Urine Negative Negative    Ketones Urine Negative Negative mg/dL    Specific Gravity Urine 1.028 1.001 - 1.030    Blood Urine 0.03 mg/dL (A) Negative    pH Urine 6.0 5.0 - 7.0    Protein Albumin Urine 50  (A) Negative mg/dL    Urobilinogen Urine <2.0 <2.0 mg/dL    Nitrite Urine Negative Negative    Leukocyte Esterase Urine 500 Laury/uL (A) Negative    Bacteria Urine Many (A) None Seen /HPF    RBC Urine 12 (H) <=2 /HPF    WBC Urine >182 (H) <=5 /HPF    Squamous Epithelials Urine 1 <=1 /HPF    Hyaline Casts Urine 5 (H) <=2 /LPF   ECG 12-LEAD WITH MUSE (LHE)   Result Value Ref Range    Systolic Blood Pressure  mmHg    Diastolic Blood Pressure  mmHg    Ventricular Rate 66 BPM    Atrial Rate 66 BPM    MT Interval 132 ms    QRS Duration 88 ms     ms    QTc 478 ms    P Axis -27 degrees    R AXIS -2 degrees    T Axis 79 degrees    Interpretation ECG       Sinus rhythm  Voltage criteria for left ventricular hypertrophy  Abnormal ECG  When compared with ECG of 12-FEB-2018 11:39,  T wave inversion now evident in Lateral leads  QT has lengthened  Confirmed by SEE ED PROVIDER NOTE FOR, ECG INTERPRETATION  ([id number]),  Vicente Gibson ([id number]) on 9/26/2022 5:01:15 PM         EKG     Sinus rhythm, rate 66.  .  QRS 88.  QTc 478.  No STEMI.     Hermilo Gli MD  09/26/22 2121

## 2022-09-26 NOTE — ED TRIAGE NOTES
"Arrives to ED with c/o AMS that began on Thursday.  reports pt unable to remember how to play card game that pt has played for 23 years. Today pt went to [employer] \"and I knew I had a purpose, but I couldn't remember what it was\". Denies vision changes. Strength equal to BUE/BLE. No facial droop noted. Speech clear. Provider called to triage for stroke eval, not called.      Triage Assessment     Row Name 09/26/22 1529       Triage Assessment (Adult)    Airway WDL WDL       Respiratory WDL    Respiratory WDL WDL       Skin Circulation/Temperature WDL    Skin Circulation/Temperature WDL WDL       Cardiac WDL    Cardiac WDL X  HTN       Peripheral/Neurovascular WDL    Peripheral Neurovascular WDL WDL       Cognitive/Neuro/Behavioral WDL    Cognitive/Neuro/Behavioral WDL X;level of consciousness    Level of Consciousness confused              "

## 2022-09-27 NOTE — ED NOTES
Pt was able to say who her sister was however when it came to questions  About the date and yr pt asked to repeat the question or she stated she could not comprehend. Pt asked repeat questions when it came to medications and treatment due to mental status. She could tell me her name and carry a conversation.

## 2022-09-27 NOTE — TELEPHONE ENCOUNTER
Called by the ED about this patient with relatively acute onset confusion 4 days ago.  ED provider provider describes it as like transient global amnesia but is lasted 4 days.  Per report has not really progressed, but is also not improving.  She is noted to be hypertensive with systolic blood pressure up to 209.  MRI specifically does not show any bilateral hippocampal lesions or pres.  She does not appear on any CNS acting medications at home with exception of low-dose mirtazapine.   She was noted to get lost while driving and not able to play card games she played for 40+ years.   Would recommend obtain a UA and a urine tox, and attempt to lower blood pressure by 20 to 25%.  If patient is not improving, may need admission for EEG monitoring.   If progressing LP could be considered, but do not necessarily think indicated at present and would prefer blood pressure reduction first.    Ravinder Cain, DO

## 2022-09-29 LAB
BACTERIA UR CULT: ABNORMAL
BACTERIA UR CULT: ABNORMAL

## 2022-10-02 ENCOUNTER — HEALTH MAINTENANCE LETTER (OUTPATIENT)
Age: 72
End: 2022-10-02

## 2023-05-20 ENCOUNTER — HEALTH MAINTENANCE LETTER (OUTPATIENT)
Age: 73
End: 2023-05-20

## 2023-10-21 ENCOUNTER — HEALTH MAINTENANCE LETTER (OUTPATIENT)
Age: 73
End: 2023-10-21

## 2023-12-30 ENCOUNTER — HEALTH MAINTENANCE LETTER (OUTPATIENT)
Age: 73
End: 2023-12-30

## 2024-07-27 ENCOUNTER — HEALTH MAINTENANCE LETTER (OUTPATIENT)
Age: 74
End: 2024-07-27

## 2024-12-08 ENCOUNTER — HEALTH MAINTENANCE LETTER (OUTPATIENT)
Age: 74
End: 2024-12-08

## 2025-01-09 ENCOUNTER — APPOINTMENT (OUTPATIENT)
Dept: CT IMAGING | Facility: HOSPITAL | Age: 75
End: 2025-01-09
Attending: EMERGENCY MEDICINE
Payer: MEDICARE

## 2025-01-09 ENCOUNTER — HOSPITAL ENCOUNTER (EMERGENCY)
Facility: HOSPITAL | Age: 75
Discharge: HOME | End: 2025-01-09
Attending: EMERGENCY MEDICINE
Payer: MEDICARE

## 2025-01-09 VITALS
SYSTOLIC BLOOD PRESSURE: 128 MMHG | OXYGEN SATURATION: 91 % | TEMPERATURE: 98.9 F | HEART RATE: 90 BPM | BODY MASS INDEX: 40.21 KG/M2 | RESPIRATION RATE: 20 BRPM | WEIGHT: 227 LBS | DIASTOLIC BLOOD PRESSURE: 58 MMHG

## 2025-01-09 DIAGNOSIS — R19.7 NAUSEA VOMITING AND DIARRHEA: ICD-10-CM

## 2025-01-09 DIAGNOSIS — R11.2 NAUSEA VOMITING AND DIARRHEA: ICD-10-CM

## 2025-01-09 DIAGNOSIS — K52.9 ENTERITIS: ICD-10-CM

## 2025-01-09 LAB
ALBUMIN SERPL BCG-MCNC: 3.5 G/DL (ref 3.5–5.2)
ALP SERPL-CCNC: 138 U/L (ref 40–150)
ALT SERPL W P-5'-P-CCNC: 15 U/L (ref 0–50)
ANION GAP SERPL CALCULATED.3IONS-SCNC: 13 MMOL/L (ref 7–15)
AST SERPL W P-5'-P-CCNC: 22 U/L (ref 0–45)
BASOPHILS # BLD AUTO: 0 10E3/UL (ref 0–0.2)
BASOPHILS NFR BLD AUTO: 0 %
BILIRUB DIRECT SERPL-MCNC: <0.2 MG/DL (ref 0–0.3)
BILIRUB SERPL-MCNC: 0.3 MG/DL
BUN SERPL-MCNC: 20.9 MG/DL (ref 8–23)
CALCIUM SERPL-MCNC: 8.6 MG/DL (ref 8.8–10.4)
CHLORIDE SERPL-SCNC: 103 MMOL/L (ref 98–107)
CREAT SERPL-MCNC: 0.94 MG/DL (ref 0.51–0.95)
EGFRCR SERPLBLD CKD-EPI 2021: 63 ML/MIN/1.73M2
EOSINOPHIL # BLD AUTO: 0.1 10E3/UL (ref 0–0.7)
EOSINOPHIL NFR BLD AUTO: 1 %
ERYTHROCYTE [DISTWIDTH] IN BLOOD BY AUTOMATED COUNT: 15.6 % (ref 10–15)
GLUCOSE SERPL-MCNC: 179 MG/DL (ref 70–99)
HCO3 SERPL-SCNC: 21 MMOL/L (ref 22–29)
HCT VFR BLD AUTO: 39.4 % (ref 35–47)
HGB BLD-MCNC: 12.6 G/DL (ref 11.7–15.7)
HOLD SPECIMEN: NORMAL
HOLD SPECIMEN: NORMAL
IMM GRANULOCYTES # BLD: 0.1 10E3/UL
IMM GRANULOCYTES NFR BLD: 1 %
LACTATE SERPL-SCNC: 1.6 MMOL/L (ref 0.7–2)
LIPASE SERPL-CCNC: 15 U/L (ref 13–60)
LYMPHOCYTES # BLD AUTO: 0.9 10E3/UL (ref 0.8–5.3)
LYMPHOCYTES NFR BLD AUTO: 9 %
MAGNESIUM SERPL-MCNC: 1.9 MG/DL (ref 1.7–2.3)
MCH RBC QN AUTO: 27.3 PG (ref 26.5–33)
MCHC RBC AUTO-ENTMCNC: 32 G/DL (ref 31.5–36.5)
MCV RBC AUTO: 85 FL (ref 78–100)
MONOCYTES # BLD AUTO: 0.5 10E3/UL (ref 0–1.3)
MONOCYTES NFR BLD AUTO: 5 %
NEUTROPHILS # BLD AUTO: 8.9 10E3/UL (ref 1.6–8.3)
NEUTROPHILS NFR BLD AUTO: 85 %
NRBC # BLD AUTO: 0 10E3/UL
NRBC BLD AUTO-RTO: 0 /100
PLATELET # BLD AUTO: 395 10E3/UL (ref 150–450)
POTASSIUM SERPL-SCNC: 4.6 MMOL/L (ref 3.4–5.3)
PROCALCITONIN SERPL IA-MCNC: 0.33 NG/ML
PROT SERPL-MCNC: 7.1 G/DL (ref 6.4–8.3)
RBC # BLD AUTO: 4.62 10E6/UL (ref 3.8–5.2)
SODIUM SERPL-SCNC: 137 MMOL/L (ref 135–145)
WBC # BLD AUTO: 10.4 10E3/UL (ref 4–11)

## 2025-01-09 PROCEDURE — 82040 ASSAY OF SERUM ALBUMIN: CPT | Performed by: EMERGENCY MEDICINE

## 2025-01-09 PROCEDURE — 84145 PROCALCITONIN (PCT): CPT | Performed by: EMERGENCY MEDICINE

## 2025-01-09 PROCEDURE — 96374 THER/PROPH/DIAG INJ IV PUSH: CPT | Mod: XU

## 2025-01-09 PROCEDURE — 99285 EMERGENCY DEPT VISIT HI MDM: CPT | Mod: 25

## 2025-01-09 PROCEDURE — 83690 ASSAY OF LIPASE: CPT | Performed by: EMERGENCY MEDICINE

## 2025-01-09 PROCEDURE — 82310 ASSAY OF CALCIUM: CPT | Performed by: EMERGENCY MEDICINE

## 2025-01-09 PROCEDURE — 85018 HEMOGLOBIN: CPT | Performed by: EMERGENCY MEDICINE

## 2025-01-09 PROCEDURE — 258N000003 HC RX IP 258 OP 636: Performed by: EMERGENCY MEDICINE

## 2025-01-09 PROCEDURE — 83605 ASSAY OF LACTIC ACID: CPT | Performed by: EMERGENCY MEDICINE

## 2025-01-09 PROCEDURE — 250N000011 HC RX IP 250 OP 636: Performed by: EMERGENCY MEDICINE

## 2025-01-09 PROCEDURE — 82248 BILIRUBIN DIRECT: CPT | Performed by: EMERGENCY MEDICINE

## 2025-01-09 PROCEDURE — 83735 ASSAY OF MAGNESIUM: CPT | Performed by: EMERGENCY MEDICINE

## 2025-01-09 PROCEDURE — 96361 HYDRATE IV INFUSION ADD-ON: CPT

## 2025-01-09 PROCEDURE — 74177 CT ABD & PELVIS W/CONTRAST: CPT

## 2025-01-09 PROCEDURE — 80048 BASIC METABOLIC PNL TOTAL CA: CPT | Performed by: EMERGENCY MEDICINE

## 2025-01-09 PROCEDURE — 36415 COLL VENOUS BLD VENIPUNCTURE: CPT | Performed by: EMERGENCY MEDICINE

## 2025-01-09 PROCEDURE — 85004 AUTOMATED DIFF WBC COUNT: CPT | Performed by: EMERGENCY MEDICINE

## 2025-01-09 PROCEDURE — 93005 ELECTROCARDIOGRAM TRACING: CPT | Performed by: EMERGENCY MEDICINE

## 2025-01-09 RX ORDER — ONDANSETRON 4 MG/1
4 TABLET, ORALLY DISINTEGRATING ORAL EVERY 6 HOURS PRN
Qty: 20 TABLET | Refills: 0 | Status: SHIPPED | OUTPATIENT
Start: 2025-01-09

## 2025-01-09 RX ORDER — ONDANSETRON 2 MG/ML
4 INJECTION INTRAMUSCULAR; INTRAVENOUS ONCE
Status: COMPLETED | OUTPATIENT
Start: 2025-01-09 | End: 2025-01-09

## 2025-01-09 RX ORDER — IOPAMIDOL 755 MG/ML
75 INJECTION, SOLUTION INTRAVASCULAR ONCE
Status: COMPLETED | OUTPATIENT
Start: 2025-01-09 | End: 2025-01-09

## 2025-01-09 RX ADMIN — IOPAMIDOL 90 ML: 755 INJECTION, SOLUTION INTRAVENOUS at 18:20

## 2025-01-09 RX ADMIN — ONDANSETRON 4 MG: 2 INJECTION INTRAMUSCULAR; INTRAVENOUS at 18:25

## 2025-01-09 RX ADMIN — SODIUM CHLORIDE 500 ML: 9 INJECTION, SOLUTION INTRAVENOUS at 15:47

## 2025-01-09 ASSESSMENT — COLUMBIA-SUICIDE SEVERITY RATING SCALE - C-SSRS
6. HAVE YOU EVER DONE ANYTHING, STARTED TO DO ANYTHING, OR PREPARED TO DO ANYTHING TO END YOUR LIFE?: NO
1. IN THE PAST MONTH, HAVE YOU WISHED YOU WERE DEAD OR WISHED YOU COULD GO TO SLEEP AND NOT WAKE UP?: NO
2. HAVE YOU ACTUALLY HAD ANY THOUGHTS OF KILLING YOURSELF IN THE PAST MONTH?: NO

## 2025-01-09 ASSESSMENT — ACTIVITIES OF DAILY LIVING (ADL)
ADLS_ACUITY_SCORE: 47
ADLS_ACUITY_SCORE: 41
ADLS_ACUITY_SCORE: 47
ADLS_ACUITY_SCORE: 41

## 2025-01-09 NOTE — ED TRIAGE NOTES
Arrival EMS from home N/V/D x24 hr, increased abdominal  distension and dizziness. Per ems sbp 67/45. Received 100 ml ivf sbp improved.     Triage Assessment (Adult)       Row Name 01/09/25 1533          Triage Assessment    Airway WDL WDL                      7

## 2025-01-09 NOTE — ED NOTES
Bed: JNED-03  Expected date: 1/9/25  Expected time: 3:16 PM  Means of arrival:   Comments:  Nettie 74 yo F hypotensive, dizzy, N/V

## 2025-01-09 NOTE — ED PROVIDER NOTES
"EMERGENCY DEPARTMENT ENCOUNTER      NAME: Yvonne Cam  AGE: 74 year old female  YOB: 1950  MRN: [id number]  EVALUATION DATE & TIME: 2025  3:25 PM    PCP: Elizabeth López    ED PROVIDER: Sunil Dias M.D.      Chief Complaint   Patient presents with    Nausea, Vomiting, & Diarrhea    Hypotension         FINAL IMPRESSION:  1. Nausea vomiting and diarrhea    2. Enteritis          ED COURSE & MEDICAL DECISION MAKIN year old female presents to the Emergency Department for evaluation of ***    3:33 PM Introduced myself to the patient, obtained history of present illness, and performed initial physical exam at this time.    6:17 PM Checked on the patient and updated her on the current treatment plan.  7:12 PM Rechecked the patient. Discussed discharge with her at this time, she is agreeable with this plan    At the conclusion of the encounter I discussed the results of all of the tests and the disposition. The questions were answered. The patient or family acknowledged understanding and was agreeable with the care plan.       Medical Decision Making  Obtained supplemental history:Supplemental history obtained?: Documented in chart and Family Member/Significant Other  Reviewed external records: {External records reviewed?:945819}  Care impacted by chronic illness:Diabetes and Hypertension  {Did you consider but not order tests?:964957}  {Did you interpret images independently?:948577}  Consultation discussion with other provider:Did you involve another provider (consultant, , pharmacy, etc.)?: No  Discharge. {zvprescriptionmeds:075650}. {zvadmissionconsidered:738157::\"See documentation for any additional details\"}.    MIPS: {ECC MIPS DOCUMENTATION:950779}          MEDICATIONS GIVEN IN THE EMERGENCY:  Medications   sodium chloride 0.9% BOLUS 500 mL (0 mLs Intravenous Stopped 25 1808)   ondansetron (ZOFRAN) injection 4 mg (4 mg Intravenous $Given 25 1825)   iopamidol (ISOVUE-370) " solution 75 mL (90 mLs Intravenous $Given 25 1039)       NEW PRESCRIPTIONS STARTED AT TODAY'S ER VISIT  New Prescriptions    ONDANSETRON (ZOFRAN ODT) 4 MG ODT TAB    Take 1 tablet (4 mg) by mouth every 6 hours as needed for nausea or vomiting.          =================================================================    HPI    Patient information was obtained from: the patient and her daughter    Use of : N/A       Yvonne Cam is a 74 year old female with a pertinent history of NSAID induced gastritis, HTN, T2DM, and GERD, who presents to this ED by EMS for evaluation of nausea, vomiting, and diarrhea.     The patient presents with vomiting and diarrhea beginning yesterday. She endorses >15 episodes of diarrhea since onset. She also notes associated fever, chills, nausea, diaphoresis, lightheadedness, and shortness of breath. The patient's abdomen is distended and firm. She reports that she came in today because her stool has turned white. She has not been out of bed all day which is abnormal for her according to daughter. The patient's daughter is an ED nurse at [employer] and is concerned she may have contracted Norovirus and spread it at home as they live together. EMS reported hypotension with a reading of 67/45 while en route to the ED. No recent bloody stools. She has not taken any medications for her symptoms prior to ED arrival. No other concerns at this time.       REVIEW OF SYSTEMS   All systems reviewed and negative except as noted in HPI.    PAST MEDICAL HISTORY:  Past Medical History:   Diagnosis Date    Anemia     Asthma     Closed right fibular fracture 2017    Depression     Diabetes mellitus (H)     type 2    Diabetes mellitus, type II (H)     Endometrial polyp     Family history of myocardial infarction     Brother 59 MI     GERD (gastroesophageal reflux disease)     High cholesterol     Hyperlipidemia     Hypertension     Hypothyroidism     Lung nodule     stable-right  middle lobe    Monoclonal paraproteinemia     Multiple falls     Nuclear cataract of right eye, nonsenile 04/04/2018    Obesity     Ocular hypertension 02/04/2016    DARRICK (obstructive sleep apnea)     Pseudophakia of left eye 06/29/2018    Shoulder pain 12/22/2014    Squamous cell carcinoma of skin        PAST SURGICAL HISTORY:  Past Surgical History:   Procedure Laterality Date    BIOPSY BREAST      biopsy    CARDIAC CATHETERIZATION  02/14/2018    CATARACT EXTRACTION Left     CV CORONARY ANGIOGRAM N/A 2/14/2018    Procedure: Coronary Angiogram;  Surgeon: Tom Pickens MD;  Location: [facility] Lab;  Service:     DILATION AND CURETTAGE      in her 20's    ESOPHAGOSCOPY, GASTROSCOPY, DUODENOSCOPY (EGD), COMBINED N/A 1/20/2018    Procedure: ESOPHAGOGASTRODUODENOSCOPY (EGD);  Surgeon: Dejah Paniagua MD;  Location: [facility];  Service:     ORIF DISTAL RADIUS FRACTURE  12/18/18    AK LAP GASTRIC BYPASS/JULY-EN-Y N/A 10/1/2014    Procedure: LAPAROSCOPIC JULY-EN-Y GASTRIC BYPASS;  Surgeon: Peterson Turcios MD;  Location: [facility] OR;  Service: General           CURRENT MEDICATIONS:    No current facility-administered medications for this encounter.     Current Outpatient Medications   Medication Sig Dispense Refill    albuterol (VENTOLIN HFA) 90 mcg/actuation inhaler [ALBUTEROL (VENTOLIN HFA) 90 MCG/ACTUATION INHALER] Inhale 2 puffs every 4 (four) hours as needed for wheezing.      bisacodyl (DULCOLAX) 5 mg EC tablet [BISACODYL (DULCOLAX) 5 MG EC TABLET] Take 5 mg by mouth daily as needed for constipation.      calcium carbonate-mag hydroxid (ROLAIDS) 550-110 mg Chew [CALCIUM CARBONATE-MAG HYDROXID (ROLAIDS) 550-110 MG CHEW] Chew 2 tablets 2 (two) times a day as needed.      cholecalciferol, vitamin D3, 1,000 unit tablet [CHOLECALCIFEROL, VITAMIN D3, 1,000 UNIT TABLET] Take 1,000 Units by mouth daily Give in morning.            cyanocobalamin 1000 MCG tablet [CYANOCOBALAMIN 1000 MCG TABLET] Take  1,000 mcg by mouth daily.      ferrous sulfate 325 (65 FE) MG tablet [FERROUS SULFATE 325 (65 FE) MG TABLET] Take 1 tablet by mouth daily with breakfast.      FLUoxetine (PROZAC) 40 MG capsule [FLUOXETINE (PROZAC) 40 MG CAPSULE] Take 80 mg by mouth daily.      fluticasone-salmeterol 113-14 mcg/actuation AePB [FLUTICASONE-SALMETEROL 113-14 MCG/ACTUATION AEPB] Inhale 1 puff every 12 (twelve) hours as needed (1 puff).      KRILL OIL ORAL [KRILL OIL ORAL] Take 1 capsule by mouth daily.      levothyroxine (SYNTHROID, LEVOTHROID) 137 MCG tablet [LEVOTHYROXINE (SYNTHROID, LEVOTHROID) 137 MCG TABLET] Take 137 mcg by mouth Daily at 6:00 am.      losartan (COZAAR) 100 MG tablet [LOSARTAN (COZAAR) 100 MG TABLET] Take 100 mg by mouth daily Give in morning .      metFORMIN (GLUCOPHAGE) 500 MG tablet [METFORMIN (GLUCOPHAGE) 500 MG TABLET] Take 500 mg by mouth 2 (two) times a day with meals.      mirtazapine (REMERON) 15 MG tablet [MIRTAZAPINE (REMERON) 15 MG TABLET] Take 15 mg by mouth at bedtime.      multivitamin with minerals (THERA-M) 9 mg iron-400 mcg Tab tablet [MULTIVITAMIN WITH MINERALS (THERA-M) 9 MG IRON-400 MCG TAB TABLET] Take 1 tablet by mouth daily Give in morning .      ondansetron (ZOFRAN ODT) 4 MG ODT tab Take 1 tablet (4 mg) by mouth every 6 hours as needed for nausea or vomiting. 20 tablet 0    senna-docusate (SENNOSIDES-DOCUSATE SODIUM) 8.6-50 mg tablet [SENNA-DOCUSATE (SENNOSIDES-DOCUSATE SODIUM) 8.6-50 MG TABLET] Take 2 tablets by mouth 2 (two) times a day.           ALLERGIES:  Allergies   Allergen Reactions    Amoxicillin Diarrhea    Atorvastatin Muscle Pain (Myalgia)    Gemfibrozil Muscle Pain (Myalgia)    Lisinopril Cough    Simvastatin Muscle Pain (Myalgia)    Sudafed [Pseudoephedrine] Other (See Comments)     Increased blood pressure    Morphine (Pf) [Morphine] Itching and Rash       FAMILY HISTORY:  Family History   Problem Relation Age of Onset    Coronary Artery Disease Brother     Diabetes Brother      Heart Disease Brother     Cataracts Mother     Breast Cancer Mother     Heart Disease Mother     Glaucoma Mother     Macular Degeneration Mother     Cataracts Father     Diabetes Sister     Crohn's Disease Sister     Thyroid Disease Sister         graves       SOCIAL HISTORY:   Social History     Socioeconomic History    Marital status:    Tobacco Use    Smoking status: Former     Current packs/day: 0.00     Types: Cigarettes     Start date: 1977     Quit date: 1987     Years since quittin.0    Smokeless tobacco: Never   Substance and Sexual Activity    Alcohol use: No    Drug use: No       VITALS:  /71   Pulse 75   Temp 98.9  F (37.2  C) (Oral)   Resp (!) 32   Wt 103 kg (227 lb)   SpO2 92%   BMI 40.21 kg/m      PHYSICAL EXAM    Constitutional: Well developed, Well nourished, NAD. ***  HENT: Normocephalic, Atraumatic. Neck Supple.  Eyes: EOMI, Conjunctiva normal.  Respiratory: Breathing comfortably on room air. Speaks full sentences easily. Lungs clear to ascultation.  Cardiovascular: Normal heart rate, Regular rhythm ***. No peripheral edema.  Abdomen: Soft, ***  Musculoskeletal: Good range of motion in all major joints. No major deformities noted.  Integument: Warm, Dry.  Neurologic: Alert & awake, Normal motor function, Normal sensory function, No focal deficits noted.   Psychiatric: Cooperative. Affect appropriate.     LAB:  All pertinent labs reviewed and interpreted.  Labs Ordered and Resulted from Time of ED Arrival to Time of ED Departure   BASIC METABOLIC PANEL - Abnormal       Result Value    Sodium 137      Potassium 4.6      Chloride 103      Carbon Dioxide (CO2) 21 (*)     Anion Gap 13      Urea Nitrogen 20.9      Creatinine 0.94      GFR Estimate 63      Calcium 8.6 (*)     Glucose 179 (*)    CBC WITH PLATELETS AND DIFFERENTIAL - Abnormal    WBC Count 10.4      RBC Count 4.62      Hemoglobin 12.6      Hematocrit 39.4      MCV 85      MCH 27.3      MCHC 32.0      RDW  15.6 (*)     Platelet Count 395      % Neutrophils 85      % Lymphocytes 9      % Monocytes 5      % Eosinophils 1      % Basophils 0      % Immature Granulocytes 1      NRBCs per 100 WBC 0      Absolute Neutrophils 8.9 (*)     Absolute Lymphocytes 0.9      Absolute Monocytes 0.5      Absolute Eosinophils 0.1      Absolute Basophils 0.0      Absolute Immature Granulocytes 0.1      Absolute NRBCs 0.0     MAGNESIUM - Normal    Magnesium 1.9     HEPATIC FUNCTION PANEL - Normal    Protein Total 7.1      Albumin 3.5      Bilirubin Total 0.3      Alkaline Phosphatase 138      AST 22      ALT 15      Bilirubin Direct <0.20     LIPASE - Normal    Lipase 15     LACTIC ACID WHOLE BLOOD WITH 1X REPEAT IN 2 HR WHEN >2 - Normal    Lactic Acid, Initial 1.6     PROCALCITONIN - Normal    Procalcitonin 0.33     C. DIFFICILE TOXIN B PCR WITH REFLEX TO C. DIFFICILE ANTIGEN AND TOXINS A/B EIA   ENTERIC BACTERIA AND VIRUS PANEL BY PCR       RADIOLOGY:  Reviewed all pertinent imaging. Please see official radiology report.  CT Abdomen Pelvis w Contrast   Final Result   IMPRESSION:    1.  Prior gastric bypass. Nonspecific fluid distention of proximal small bowel without transition point. Findings suggest underlying enteritis.          EKG:    Performed at: 09-JAN-2025 1541    Impression: Sinus rhythm with marked sinus arrhythmia; Minimal voltage criteria for LVH (may be normal variant), Nonspecific ST abnormality, Prolonged QT    Rate: 83 bpm  Rhythm: Sinus with sinus arrhythmia  Axis: 14  KS Interval: 170 ms  QRS Interval: 80 ms  QTc Interval: 486 ms  ST Changes: Nonspecific ST abnormality  Comparison: When compared with ECG from 26-SEP-2022 1556, No significant change was found.     I have independently reviewed and interpreted the EKG(s) documented above.    PROCEDURES:   N/A      I, Jazmin Gacria, am serving as a scribe to document services personally performed by Dr. Sunil Dias, based on my observation and the provider's  statements to me. I, Sunil Dias MD attest that Jazmin Garcia is acting in a scribe capacity, has observed my performance of the services and has documented them in accordance with my direction.    Sunil Dias M.D.  Emergency Medicine  [facility] EMERGENCY DEPARTMENT  [address] [place] [id number] [phone]  Dept: [phone]    ELLEN Dias.  Emergency Medicine  [facility] EMERGENCY DEPARTMENT  [address] [place] [id number] [phone]  Dept: [phone]      Sunil Dias MD  01/10/25 2053

## 2025-01-10 LAB
ATRIAL RATE - MUSE: 83 BPM
DIASTOLIC BLOOD PRESSURE - MUSE: 63 MMHG
INTERPRETATION ECG - MUSE: NORMAL
P AXIS - MUSE: 56 DEGREES
PR INTERVAL - MUSE: 170 MS
QRS DURATION - MUSE: 80 MS
QT - MUSE: 414 MS
QTC - MUSE: 486 MS
R AXIS - MUSE: 14 DEGREES
SYSTOLIC BLOOD PRESSURE - MUSE: 144 MMHG
T AXIS - MUSE: 64 DEGREES
VENTRICULAR RATE- MUSE: 83 BPM

## 2025-01-10 NOTE — DISCHARGE INSTRUCTIONS
You were seen in the emergency department for nausea vomiting and diarrhea.  Your evaluation included labs and CT imaging that overall looks stable.  There were some fluid-filled bowel loops on your CT suggestive of an enteritis.  We think your symptoms are consistent with a viral gastrointestinal infection similar to norovirus as you suspected.  We are going to prescribe Zofran that you can use for further nausea control at home.  Please make sure that you are drinking small but frequent doses of liquids to keep yourself well-hydrated.  If you do have severe worsening of symptoms like intractable vomiting, severe weakness, or other immediate concern we can reevaluate anytime in the hospital as needed.

## 2025-02-16 ENCOUNTER — HEALTH MAINTENANCE LETTER (OUTPATIENT)
Age: 75
End: 2025-02-16

## 2025-08-31 ENCOUNTER — HEALTH MAINTENANCE LETTER (OUTPATIENT)
Age: 75
End: 2025-08-31